# Patient Record
Sex: FEMALE | Race: WHITE | NOT HISPANIC OR LATINO
[De-identification: names, ages, dates, MRNs, and addresses within clinical notes are randomized per-mention and may not be internally consistent; named-entity substitution may affect disease eponyms.]

---

## 2017-05-03 DIAGNOSIS — J45.901 UNSPECIFIED ASTHMA WITH (ACUTE) EXACERBATION: ICD-10-CM

## 2017-05-03 DIAGNOSIS — L30.9 DERMATITIS, UNSPECIFIED: ICD-10-CM

## 2017-05-03 DIAGNOSIS — N89.8 OTHER SPECIFIED NONINFLAMMATORY DISORDERS OF VAGINA: ICD-10-CM

## 2017-05-03 DIAGNOSIS — Z33.1 PREGNANT STATE, INCIDENTAL: ICD-10-CM

## 2017-05-03 DIAGNOSIS — D50.9 IRON DEFICIENCY ANEMIA, UNSPECIFIED: ICD-10-CM

## 2017-05-03 DIAGNOSIS — R94.31 ABNORMAL ELECTROCARDIOGRAM [ECG] [EKG]: ICD-10-CM

## 2017-05-03 DIAGNOSIS — L50.3 DERMATOGRAPHIC URTICARIA: ICD-10-CM

## 2017-05-03 DIAGNOSIS — Z87.898 PERSONAL HISTORY OF OTHER SPECIFIED CONDITIONS: ICD-10-CM

## 2017-05-03 DIAGNOSIS — R00.2 PALPITATIONS: ICD-10-CM

## 2017-05-05 ENCOUNTER — APPOINTMENT (OUTPATIENT)
Dept: FAMILY MEDICINE | Facility: CLINIC | Age: 47
End: 2017-05-05

## 2017-06-26 ENCOUNTER — OUTPATIENT (OUTPATIENT)
Dept: OUTPATIENT SERVICES | Facility: HOSPITAL | Age: 47
LOS: 1 days | End: 2017-06-26
Payer: COMMERCIAL

## 2017-06-26 ENCOUNTER — APPOINTMENT (OUTPATIENT)
Dept: ULTRASOUND IMAGING | Facility: CLINIC | Age: 47
End: 2017-06-26

## 2017-06-26 ENCOUNTER — APPOINTMENT (OUTPATIENT)
Dept: MAMMOGRAPHY | Facility: CLINIC | Age: 47
End: 2017-06-26

## 2017-06-26 DIAGNOSIS — Z00.00 ENCOUNTER FOR GENERAL ADULT MEDICAL EXAMINATION WITHOUT ABNORMAL FINDINGS: ICD-10-CM

## 2017-06-26 DIAGNOSIS — Z98.89 OTHER SPECIFIED POSTPROCEDURAL STATES: Chronic | ICD-10-CM

## 2017-06-26 PROCEDURE — 77067 SCR MAMMO BI INCL CAD: CPT

## 2017-06-26 PROCEDURE — 77063 BREAST TOMOSYNTHESIS BI: CPT

## 2017-09-15 ENCOUNTER — APPOINTMENT (OUTPATIENT)
Dept: FAMILY MEDICINE | Facility: CLINIC | Age: 47
End: 2017-09-15

## 2017-12-06 ENCOUNTER — TRANSCRIPTION ENCOUNTER (OUTPATIENT)
Age: 47
End: 2017-12-06

## 2018-04-25 ENCOUNTER — APPOINTMENT (OUTPATIENT)
Dept: FAMILY MEDICINE | Facility: CLINIC | Age: 48
End: 2018-04-25
Payer: COMMERCIAL

## 2018-04-25 VITALS
HEART RATE: 64 BPM | HEIGHT: 67 IN | OXYGEN SATURATION: 99 % | WEIGHT: 171 LBS | DIASTOLIC BLOOD PRESSURE: 66 MMHG | SYSTOLIC BLOOD PRESSURE: 120 MMHG | BODY MASS INDEX: 26.84 KG/M2 | RESPIRATION RATE: 14 BRPM

## 2018-04-25 PROCEDURE — 99203 OFFICE O/P NEW LOW 30 MIN: CPT

## 2018-05-16 LAB
25(OH)D3 SERPL-MCNC: 21.8 NG/ML
ALBUMIN SERPL ELPH-MCNC: 4.2 G/DL
ALP BLD-CCNC: 66 U/L
ALT SERPL-CCNC: 7 U/L
ANION GAP SERPL CALC-SCNC: 15 MMOL/L
AST SERPL-CCNC: 17 U/L
BASOPHILS # BLD AUTO: 0.05 K/UL
BASOPHILS NFR BLD AUTO: 0.8 %
BILIRUB SERPL-MCNC: 0.3 MG/DL
BUN SERPL-MCNC: 11 MG/DL
CALCIUM SERPL-MCNC: 9.5 MG/DL
CHLORIDE SERPL-SCNC: 104 MMOL/L
CHOLEST SERPL-MCNC: 163 MG/DL
CHOLEST/HDLC SERPL: 2.8 RATIO
CO2 SERPL-SCNC: 23 MMOL/L
CREAT SERPL-MCNC: 0.93 MG/DL
EOSINOPHIL # BLD AUTO: 0.36 K/UL
EOSINOPHIL NFR BLD AUTO: 5.6 %
GLUCOSE SERPL-MCNC: 90 MG/DL
HCT VFR BLD CALC: 33.8 %
HDLC SERPL-MCNC: 58 MG/DL
HGB BLD-MCNC: 10.3 G/DL
IMM GRANULOCYTES NFR BLD AUTO: 0.2 %
LDLC SERPL CALC-MCNC: 90 MG/DL
LYMPHOCYTES # BLD AUTO: 1.7 K/UL
LYMPHOCYTES NFR BLD AUTO: 26.6 %
MAN DIFF?: NORMAL
MCHC RBC-ENTMCNC: 22.9 PG
MCHC RBC-ENTMCNC: 30.5 GM/DL
MCV RBC AUTO: 75.1 FL
MONOCYTES # BLD AUTO: 0.5 K/UL
MONOCYTES NFR BLD AUTO: 7.8 %
NEUTROPHILS # BLD AUTO: 3.76 K/UL
NEUTROPHILS NFR BLD AUTO: 59 %
PLATELET # BLD AUTO: 299 K/UL
POTASSIUM SERPL-SCNC: 4.6 MMOL/L
PROT SERPL-MCNC: 7.4 G/DL
RBC # BLD: 4.5 M/UL
RBC # FLD: 16.2 %
SODIUM SERPL-SCNC: 142 MMOL/L
TRIGL SERPL-MCNC: 73 MG/DL
TSH SERPL-ACNC: 4.06 UIU/ML
VIT B12 SERPL-MCNC: 499 PG/ML
WBC # FLD AUTO: 6.38 K/UL

## 2018-05-21 ENCOUNTER — OTHER (OUTPATIENT)
Age: 48
End: 2018-05-21

## 2018-05-25 ENCOUNTER — APPOINTMENT (OUTPATIENT)
Dept: FAMILY MEDICINE | Facility: CLINIC | Age: 48
End: 2018-05-25

## 2018-10-29 ENCOUNTER — TRANSCRIPTION ENCOUNTER (OUTPATIENT)
Age: 48
End: 2018-10-29

## 2018-11-20 LAB
FERRITIN SERPL-MCNC: 19 NG/ML
IRON SATN MFR SERPL: 17 %
IRON SERPL-MCNC: 52 UG/DL
TIBC SERPL-MCNC: 307 UG/DL
UIBC SERPL-MCNC: 255 UG/DL

## 2018-12-28 ENCOUNTER — APPOINTMENT (OUTPATIENT)
Dept: MAMMOGRAPHY | Facility: CLINIC | Age: 48
End: 2018-12-28
Payer: COMMERCIAL

## 2018-12-28 ENCOUNTER — OUTPATIENT (OUTPATIENT)
Dept: OUTPATIENT SERVICES | Facility: HOSPITAL | Age: 48
LOS: 1 days | End: 2018-12-28
Payer: COMMERCIAL

## 2018-12-28 DIAGNOSIS — Z98.89 OTHER SPECIFIED POSTPROCEDURAL STATES: Chronic | ICD-10-CM

## 2018-12-28 DIAGNOSIS — Z12.31 ENCOUNTER FOR SCREENING MAMMOGRAM FOR MALIGNANT NEOPLASM OF BREAST: ICD-10-CM

## 2018-12-28 PROCEDURE — 77063 BREAST TOMOSYNTHESIS BI: CPT | Mod: 26

## 2018-12-28 PROCEDURE — 77063 BREAST TOMOSYNTHESIS BI: CPT

## 2018-12-28 PROCEDURE — 77067 SCR MAMMO BI INCL CAD: CPT | Mod: 26

## 2018-12-28 PROCEDURE — 77067 SCR MAMMO BI INCL CAD: CPT

## 2019-07-01 ENCOUNTER — MEDICATION RENEWAL (OUTPATIENT)
Age: 49
End: 2019-07-01

## 2019-07-29 ENCOUNTER — TRANSCRIPTION ENCOUNTER (OUTPATIENT)
Age: 49
End: 2019-07-29

## 2019-09-13 ENCOUNTER — NON-APPOINTMENT (OUTPATIENT)
Age: 49
End: 2019-09-13

## 2019-09-13 ENCOUNTER — APPOINTMENT (OUTPATIENT)
Dept: FAMILY MEDICINE | Facility: CLINIC | Age: 49
End: 2019-09-13
Payer: COMMERCIAL

## 2019-09-13 VITALS
SYSTOLIC BLOOD PRESSURE: 118 MMHG | WEIGHT: 166 LBS | OXYGEN SATURATION: 98 % | HEIGHT: 67 IN | DIASTOLIC BLOOD PRESSURE: 70 MMHG | HEART RATE: 65 BPM | BODY MASS INDEX: 26.06 KG/M2

## 2019-09-13 DIAGNOSIS — Z72.89 OTHER PROBLEMS RELATED TO LIFESTYLE: ICD-10-CM

## 2019-09-13 DIAGNOSIS — Z80.0 FAMILY HISTORY OF MALIGNANT NEOPLASM OF DIGESTIVE ORGANS: ICD-10-CM

## 2019-09-13 LAB
BILIRUB UR QL STRIP: NORMAL
GLUCOSE UR-MCNC: NORMAL
HCG UR QL: 1 EU/DL
HGB UR QL STRIP.AUTO: NORMAL
KETONES UR-MCNC: NORMAL
LEUKOCYTE ESTERASE UR QL STRIP: ABNORMAL
NITRITE UR QL STRIP: NORMAL
PH UR STRIP: 7
PROT UR STRIP-MCNC: NORMAL
SP GR UR STRIP: 1.01

## 2019-09-13 PROCEDURE — 81003 URINALYSIS AUTO W/O SCOPE: CPT | Mod: QW

## 2019-09-13 PROCEDURE — 99396 PREV VISIT EST AGE 40-64: CPT | Mod: 25

## 2019-09-13 PROCEDURE — 93000 ELECTROCARDIOGRAM COMPLETE: CPT

## 2019-09-13 PROCEDURE — 36415 COLL VENOUS BLD VENIPUNCTURE: CPT

## 2019-09-13 NOTE — HISTORY OF PRESENT ILLNESS
[FreeTextEntry1] : Pt is here for annual physical. [de-identified] : pt here for fu, pt notes asthma good, pt notes nom complaints will be getting flu shot at work, pt has hx of anemia she recalls all her life, pt note no blood in stool, nor urine, pt still gets cycle

## 2019-09-13 NOTE — DATA REVIEWED
[FreeTextEntry1] : urine was neg, ekg was good\par oae passed\par cbc hgb was 10 in 2013 hgb elecrophoresis in 2015 was nl

## 2019-09-13 NOTE — HEALTH RISK ASSESSMENT
[Never (0 pts)] : Never (0 points) [No] : In the past 12 months have you used drugs other than those required for medical reasons? No [No falls in past year] : Patient reported no falls in the past year [0] : 2) Feeling down, depressed, or hopeless: Not at all (0) [Excellent] : ~his/her~ current health as excellent [Very Good] : ~his/her~  mood as very good [FreeTextEntry1] : cpe,  [] : No [Audit-CScore] : 0 [[id number]] : 0

## 2019-09-16 LAB
25(OH)D3 SERPL-MCNC: 40.4 NG/ML
ALBUMIN SERPL ELPH-MCNC: 4.6 G/DL
ALP BLD-CCNC: 56 U/L
ALT SERPL-CCNC: 11 U/L
ANION GAP SERPL CALC-SCNC: 12 MMOL/L
AST SERPL-CCNC: 22 U/L
BILIRUB SERPL-MCNC: 0.2 MG/DL
BUN SERPL-MCNC: 11 MG/DL
CALCIUM SERPL-MCNC: 9.9 MG/DL
CHLORIDE SERPL-SCNC: 100 MMOL/L
CHOLEST SERPL-MCNC: 164 MG/DL
CHOLEST/HDLC SERPL: 2.4 RATIO
CO2 SERPL-SCNC: 26 MMOL/L
CREAT SERPL-MCNC: 0.74 MG/DL
GLUCOSE SERPL-MCNC: 85 MG/DL
HDLC SERPL-MCNC: 69 MG/DL
LDLC SERPL CALC-MCNC: 84 MG/DL
POTASSIUM SERPL-SCNC: 4.4 MMOL/L
PROT SERPL-MCNC: 7.7 G/DL
SODIUM SERPL-SCNC: 138 MMOL/L
TRIGL SERPL-MCNC: 55 MG/DL
TSH SERPL-ACNC: 1.59 UIU/ML
VIT B12 SERPL-MCNC: 497 PG/ML

## 2019-10-02 LAB
HGB A MFR BLD: 97.5 %
HGB A2 MFR BLD: 2.5 %
HGB FRACT BLD-IMP: NORMAL

## 2019-10-08 ENCOUNTER — CLINICAL ADVICE (OUTPATIENT)
Age: 49
End: 2019-10-08

## 2019-10-16 LAB
BASOPHILS # BLD AUTO: 0.07 K/UL
BASOPHILS NFR BLD AUTO: 1.1 %
EOSINOPHIL # BLD AUTO: 0.79 K/UL
EOSINOPHIL NFR BLD AUTO: 12.1 %
HCT VFR BLD CALC: 40.3 %
HGB BLD-MCNC: 13.1 G/DL
IMM GRANULOCYTES NFR BLD AUTO: 0.3 %
LYMPHOCYTES # BLD AUTO: 1.79 K/UL
LYMPHOCYTES NFR BLD AUTO: 27.4 %
MAN DIFF?: NORMAL
MCHC RBC-ENTMCNC: 29.4 PG
MCHC RBC-ENTMCNC: 32.5 GM/DL
MCV RBC AUTO: 90.6 FL
MONOCYTES # BLD AUTO: 0.55 K/UL
MONOCYTES NFR BLD AUTO: 8.4 %
NEUTROPHILS # BLD AUTO: 3.32 K/UL
NEUTROPHILS NFR BLD AUTO: 50.7 %
PLATELET # BLD AUTO: 252 K/UL
RBC # BLD: 4.45 M/UL
RBC # FLD: 13.2 %
WBC # FLD AUTO: 6.54 K/UL

## 2020-01-12 ENCOUNTER — TRANSCRIPTION ENCOUNTER (OUTPATIENT)
Age: 50
End: 2020-01-12

## 2020-02-03 ENCOUNTER — APPOINTMENT (OUTPATIENT)
Dept: FAMILY MEDICINE | Facility: CLINIC | Age: 50
End: 2020-02-03
Payer: COMMERCIAL

## 2020-02-03 VITALS
DIASTOLIC BLOOD PRESSURE: 68 MMHG | SYSTOLIC BLOOD PRESSURE: 100 MMHG | HEART RATE: 64 BPM | HEIGHT: 67 IN | WEIGHT: 170 LBS | OXYGEN SATURATION: 99 % | BODY MASS INDEX: 26.68 KG/M2

## 2020-02-03 DIAGNOSIS — S60.229A CONTUSION OF UNSPECIFIED HAND, INITIAL ENCOUNTER: ICD-10-CM

## 2020-02-03 LAB — CYTOLOGY CVX/VAG DOC THIN PREP: NEGATIVE

## 2020-02-03 PROCEDURE — 99214 OFFICE O/P EST MOD 30 MIN: CPT

## 2020-02-03 NOTE — HISTORY OF PRESENT ILLNESS
[FreeTextEntry1] : Pt here to follow up from Urgent care. Pt fell 01/09/2020 trip on curb and twisted left hand. Pt c/o tingling sensation left hand [de-identified] : pt needs xray of l hand as persistent swelling and stiff and tingling into index and middle finger, pt notes some tingling at l wrist, pt locates some soresnees on ulnar side of wrist, at time of injury black and blus ofsecond and third fingers, pt notes she did not hit head, pt notes no problems ambulating  \par pt reports she has been usingf her inhlaer more often in am thinking it is her asthma but she reports is a constant cleariong of her throat

## 2020-02-06 ENCOUNTER — FORM ENCOUNTER (OUTPATIENT)
Age: 50
End: 2020-02-06

## 2020-02-07 ENCOUNTER — OUTPATIENT (OUTPATIENT)
Dept: OUTPATIENT SERVICES | Facility: HOSPITAL | Age: 50
LOS: 1 days | End: 2020-02-07
Payer: COMMERCIAL

## 2020-02-07 ENCOUNTER — APPOINTMENT (OUTPATIENT)
Dept: RADIOLOGY | Facility: CLINIC | Age: 50
End: 2020-02-07
Payer: COMMERCIAL

## 2020-02-07 DIAGNOSIS — Z98.89 OTHER SPECIFIED POSTPROCEDURAL STATES: Chronic | ICD-10-CM

## 2020-02-07 DIAGNOSIS — S60.229A CONTUSION OF UNSPECIFIED HAND, INITIAL ENCOUNTER: ICD-10-CM

## 2020-02-07 PROCEDURE — 73130 X-RAY EXAM OF HAND: CPT

## 2020-02-07 PROCEDURE — 73110 X-RAY EXAM OF WRIST: CPT

## 2020-02-07 PROCEDURE — 73110 X-RAY EXAM OF WRIST: CPT | Mod: 26,LT

## 2020-02-07 PROCEDURE — 73130 X-RAY EXAM OF HAND: CPT | Mod: 26,LT

## 2020-02-25 ENCOUNTER — APPOINTMENT (OUTPATIENT)
Dept: ORTHOPEDIC SURGERY | Facility: CLINIC | Age: 50
End: 2020-02-25
Payer: COMMERCIAL

## 2020-02-25 VITALS
SYSTOLIC BLOOD PRESSURE: 114 MMHG | WEIGHT: 170 LBS | HEART RATE: 60 BPM | BODY MASS INDEX: 26.68 KG/M2 | HEIGHT: 67 IN | DIASTOLIC BLOOD PRESSURE: 73 MMHG

## 2020-02-25 DIAGNOSIS — M65.322 TRIGGER FINGER, LEFT INDEX FINGER: ICD-10-CM

## 2020-02-25 DIAGNOSIS — M65.332 TRIGGER FINGER, LEFT MIDDLE FINGER: ICD-10-CM

## 2020-02-25 PROCEDURE — 99204 OFFICE O/P NEW MOD 45 MIN: CPT | Mod: 25

## 2020-02-25 PROCEDURE — 20550 NJX 1 TENDON SHEATH/LIGAMENT: CPT | Mod: 59,F2

## 2020-04-25 ENCOUNTER — MESSAGE (OUTPATIENT)
Age: 50
End: 2020-04-25

## 2020-05-10 ENCOUNTER — TRANSCRIPTION ENCOUNTER (OUTPATIENT)
Age: 50
End: 2020-05-10

## 2020-05-15 ENCOUNTER — APPOINTMENT (OUTPATIENT)
Dept: DISASTER EMERGENCY | Facility: CLINIC | Age: 50
End: 2020-05-15

## 2020-12-03 ENCOUNTER — APPOINTMENT (OUTPATIENT)
Dept: MAMMOGRAPHY | Facility: CLINIC | Age: 50
End: 2020-12-03
Payer: COMMERCIAL

## 2020-12-03 ENCOUNTER — OUTPATIENT (OUTPATIENT)
Dept: OUTPATIENT SERVICES | Facility: HOSPITAL | Age: 50
LOS: 1 days | End: 2020-12-03
Payer: COMMERCIAL

## 2020-12-03 DIAGNOSIS — Z00.8 ENCOUNTER FOR OTHER GENERAL EXAMINATION: ICD-10-CM

## 2020-12-03 DIAGNOSIS — Z12.31 ENCOUNTER FOR SCREENING MAMMOGRAM FOR MALIGNANT NEOPLASM OF BREAST: ICD-10-CM

## 2020-12-03 DIAGNOSIS — Z98.89 OTHER SPECIFIED POSTPROCEDURAL STATES: Chronic | ICD-10-CM

## 2020-12-03 PROCEDURE — 77063 BREAST TOMOSYNTHESIS BI: CPT

## 2020-12-03 PROCEDURE — 77067 SCR MAMMO BI INCL CAD: CPT

## 2020-12-03 PROCEDURE — 77067 SCR MAMMO BI INCL CAD: CPT | Mod: 26

## 2020-12-03 PROCEDURE — 77063 BREAST TOMOSYNTHESIS BI: CPT | Mod: 26

## 2021-03-13 ENCOUNTER — APPOINTMENT (OUTPATIENT)
Dept: FAMILY MEDICINE | Facility: CLINIC | Age: 51
End: 2021-03-13
Payer: COMMERCIAL

## 2021-03-13 ENCOUNTER — NON-APPOINTMENT (OUTPATIENT)
Age: 51
End: 2021-03-13

## 2021-03-13 VITALS
BODY MASS INDEX: 25.9 KG/M2 | HEART RATE: 84 BPM | WEIGHT: 165 LBS | SYSTOLIC BLOOD PRESSURE: 114 MMHG | HEIGHT: 67 IN | DIASTOLIC BLOOD PRESSURE: 70 MMHG | OXYGEN SATURATION: 99 % | TEMPERATURE: 97.2 F

## 2021-03-13 DIAGNOSIS — D64.9 ANEMIA, UNSPECIFIED: ICD-10-CM

## 2021-03-13 DIAGNOSIS — J45.901 UNSPECIFIED ASTHMA WITH (ACUTE) EXACERBATION: ICD-10-CM

## 2021-03-13 DIAGNOSIS — R07.89 OTHER CHEST PAIN: ICD-10-CM

## 2021-03-13 PROCEDURE — 99214 OFFICE O/P EST MOD 30 MIN: CPT | Mod: 25

## 2021-03-13 PROCEDURE — 99072 ADDL SUPL MATRL&STAF TM PHE: CPT

## 2021-03-13 PROCEDURE — 36415 COLL VENOUS BLD VENIPUNCTURE: CPT

## 2021-03-13 PROCEDURE — 93000 ELECTROCARDIOGRAM COMPLETE: CPT

## 2021-03-13 RX ORDER — METHYLPREDNISOLONE 40 MG/ML
40 INJECTION, POWDER, LYOPHILIZED, FOR SOLUTION INTRAMUSCULAR; INTRAVENOUS
Qty: 1 | Refills: 0 | Status: DISCONTINUED | COMMUNITY
Start: 2020-02-25 | End: 2021-03-13

## 2021-03-13 NOTE — HISTORY OF PRESENT ILLNESS
[FreeTextEntry1] : Pt following up on asthma, pt notes kicked up in early feb, pt had last  covid shot early feb, , pt notes cough seems to be at night and if laugh cough , pt notes feels pressure  feel exercise, notes wheeze at times at night, , pt is a runner, runs, 2 times a week, , about 3 miles  a time, pt notes not as easy to do over last 6 weeks,pt notes no fever, no nasal, pt notes no cp, pt notes when fire place is on she feels tight and coughs, pt notes no problem before , , pt has never been swabbed for covid, no active ss now, pt had congestion in feb in chest and  none now, pt notes now still w chest restiction , feel a pressure when exerts, pt notes non painful, pt has not had labs since 2019 when anemia resolved

## 2021-03-13 NOTE — REVIEW OF SYSTEMS
[Fever] : no fever [Chest Pain] : no chest pain [Palpitations] : no palpitations [Shortness Of Breath] : no shortness of breath [Wheezing] : wheezing [Cough] : cough [Dyspnea on Exertion] : no dyspnea on exertion [FreeTextEntry6] : chest feels pressure w exertion

## 2021-03-15 ENCOUNTER — OUTPATIENT (OUTPATIENT)
Dept: OUTPATIENT SERVICES | Facility: HOSPITAL | Age: 51
LOS: 1 days | End: 2021-03-15
Payer: COMMERCIAL

## 2021-03-15 ENCOUNTER — APPOINTMENT (OUTPATIENT)
Dept: RADIOLOGY | Facility: CLINIC | Age: 51
End: 2021-03-15
Payer: COMMERCIAL

## 2021-03-15 DIAGNOSIS — J45.901 UNSPECIFIED ASTHMA WITH (ACUTE) EXACERBATION: ICD-10-CM

## 2021-03-15 DIAGNOSIS — Z98.89 OTHER SPECIFIED POSTPROCEDURAL STATES: Chronic | ICD-10-CM

## 2021-03-15 LAB
25(OH)D3 SERPL-MCNC: 30.9 NG/ML
ALBUMIN SERPL ELPH-MCNC: 4.3 G/DL
ALP BLD-CCNC: 50 U/L
ALT SERPL-CCNC: 14 U/L
ANION GAP SERPL CALC-SCNC: 5 MMOL/L
AST SERPL-CCNC: 21 U/L
BASOPHILS # BLD AUTO: 0.05 K/UL
BASOPHILS NFR BLD AUTO: 1 %
BILIRUB SERPL-MCNC: 0.6 MG/DL
BUN SERPL-MCNC: 13 MG/DL
CALCIUM SERPL-MCNC: 9.3 MG/DL
CHLORIDE SERPL-SCNC: 106 MMOL/L
CHOLEST SERPL-MCNC: 146 MG/DL
CO2 SERPL-SCNC: 28 MMOL/L
CREAT SERPL-MCNC: 0.83 MG/DL
EOSINOPHIL # BLD AUTO: 0.48 K/UL
EOSINOPHIL NFR BLD AUTO: 9.5 %
GLUCOSE SERPL-MCNC: 90 MG/DL
HCT VFR BLD CALC: 38.1 %
HDLC SERPL-MCNC: 58 MG/DL
HGB BLD-MCNC: 12.7 G/DL
IMM GRANULOCYTES NFR BLD AUTO: 0.2 %
IRON SERPL-MCNC: 65 UG/DL
LDLC SERPL CALC-MCNC: 80 MG/DL
LYMPHOCYTES # BLD AUTO: 1.61 K/UL
LYMPHOCYTES NFR BLD AUTO: 31.8 %
MAN DIFF?: NORMAL
MCHC RBC-ENTMCNC: 30.8 PG
MCHC RBC-ENTMCNC: 33.3 GM/DL
MCV RBC AUTO: 92.3 FL
MONOCYTES # BLD AUTO: 0.41 K/UL
MONOCYTES NFR BLD AUTO: 8.1 %
NEUTROPHILS # BLD AUTO: 2.51 K/UL
NEUTROPHILS NFR BLD AUTO: 49.4 %
NONHDLC SERPL-MCNC: 89 MG/DL
PLATELET # BLD AUTO: 239 K/UL
POTASSIUM SERPL-SCNC: 4.2 MMOL/L
PROT SERPL-MCNC: 7.1 G/DL
RBC # BLD: 4.13 M/UL
RBC # FLD: 13.6 %
SODIUM SERPL-SCNC: 139 MMOL/L
TRIGL SERPL-MCNC: 43 MG/DL
TSH SERPL-ACNC: 1.8 UIU/ML
WBC # FLD AUTO: 5.07 K/UL

## 2021-03-15 PROCEDURE — 71046 X-RAY EXAM CHEST 2 VIEWS: CPT | Mod: 26

## 2021-03-15 PROCEDURE — 71046 X-RAY EXAM CHEST 2 VIEWS: CPT

## 2021-06-18 ENCOUNTER — APPOINTMENT (OUTPATIENT)
Dept: DERMATOLOGY | Facility: CLINIC | Age: 51
End: 2021-06-18

## 2021-10-14 ENCOUNTER — APPOINTMENT (OUTPATIENT)
Dept: FAMILY MEDICINE | Facility: CLINIC | Age: 51
End: 2021-10-14
Payer: COMMERCIAL

## 2021-10-14 VITALS
SYSTOLIC BLOOD PRESSURE: 128 MMHG | DIASTOLIC BLOOD PRESSURE: 78 MMHG | WEIGHT: 165 LBS | OXYGEN SATURATION: 99 % | BODY MASS INDEX: 25.9 KG/M2 | HEIGHT: 67 IN | HEART RATE: 77 BPM | TEMPERATURE: 97.5 F

## 2021-10-14 DIAGNOSIS — M54.50 LOW BACK PAIN, UNSPECIFIED: ICD-10-CM

## 2021-10-14 DIAGNOSIS — M54.9 DORSALGIA, UNSPECIFIED: ICD-10-CM

## 2021-10-14 PROCEDURE — 96372 THER/PROPH/DIAG INJ SC/IM: CPT

## 2021-10-14 PROCEDURE — 99214 OFFICE O/P EST MOD 30 MIN: CPT | Mod: 25

## 2021-10-14 RX ORDER — DEXAMETHASONE SODIUM PHOSPHATE 4 MG/ML
4 INJECTION, SOLUTION INTRAMUSCULAR; INTRAVENOUS
Qty: 0 | Refills: 0 | Status: COMPLETED | OUTPATIENT
Start: 2021-10-14

## 2021-10-14 RX ADMIN — DEXAMETHASONE SODIUM PHOSPHATE 2 MG/ML: 4 INJECTION, SOLUTION INTRA-ARTICULAR; INTRALESIONAL; INTRAMUSCULAR; INTRAVENOUS; SOFT TISSUE at 00:00

## 2021-10-14 NOTE — HISTORY OF PRESENT ILLNESS
[FreeTextEntry8] : Ms. ZAHEER BOWENS is a 51 year female who presents to office c/o severe low back pain x 2 days.\par Pt. states she was showing a coworker something at work and moved her foot outwards and felt and heard a snap/pop in her left lower back. Patient makes it difficult to sit or stand or bend. She has mild tingling in the upper front left thigh, denies bladder or bowel symptoms, or extremity weakness.  Pt. states the pain is a 20 on a scale of 1-10.

## 2021-10-14 NOTE — PHYSICAL EXAM
[Normal] : no acute distress, well nourished, well developed and well-appearing [Normal Sclera/Conjunctiva] : normal sclera/conjunctiva [Normal Outer Ear/Nose] : the outer ears and nose were normal in appearance [Supple] : supple [No Respiratory Distress] : no respiratory distress  [No Accessory Muscle Use] : no accessory muscle use [No Edema] : there was no peripheral edema [No Focal Deficits] : no focal deficits [No Joint Swelling] : no joint swelling [Normal Gait] : normal gait [Normal Affect] : the affect was normal [Normal Insight/Judgement] : insight and judgment were intact [de-identified] : left lumbar paraspinal muscle spasm

## 2021-10-15 ENCOUNTER — APPOINTMENT (OUTPATIENT)
Dept: RADIOLOGY | Facility: CLINIC | Age: 51
End: 2021-10-15

## 2021-10-18 ENCOUNTER — APPOINTMENT (OUTPATIENT)
Dept: CT IMAGING | Facility: CLINIC | Age: 51
End: 2021-10-18

## 2021-10-20 ENCOUNTER — APPOINTMENT (OUTPATIENT)
Dept: ORTHOPEDIC SURGERY | Facility: CLINIC | Age: 51
End: 2021-10-20
Payer: COMMERCIAL

## 2021-10-20 VITALS
HEIGHT: 67 IN | SYSTOLIC BLOOD PRESSURE: 125 MMHG | HEART RATE: 68 BPM | DIASTOLIC BLOOD PRESSURE: 83 MMHG | BODY MASS INDEX: 25.9 KG/M2 | WEIGHT: 165 LBS

## 2021-10-20 DIAGNOSIS — M47.816 SPONDYLOSIS W/OUT MYELOPATHY OR RADICULOPATHY, LUMBAR REGION: ICD-10-CM

## 2021-10-20 DIAGNOSIS — M60.9 MYOSITIS, UNSPECIFIED: ICD-10-CM

## 2021-10-20 PROCEDURE — 72100 X-RAY EXAM L-S SPINE 2/3 VWS: CPT

## 2021-10-20 PROCEDURE — 99214 OFFICE O/P EST MOD 30 MIN: CPT

## 2021-10-20 NOTE — HISTORY OF PRESENT ILLNESS
[de-identified] : Susy is a 50 y/o female here for evaluation of low back and occasional pain into both anterior thigh. She notes this pain started about 2 weeks ago, she felt she may have pulled her back, but currently she's having difficulty sitting, noting sitting for long periods of time which is required from her job, pain exacerbates in the low back.  She states standing and walking is better. She notes when pain is intense in the low back, she does feel a twinge of pain radiating into both anterior thighs.  She has taken a medrol dose pack with some relief but no other therapy at this time. She denied weakness or loss of bowel or bladder function.

## 2021-10-20 NOTE — REASON FOR VISIT
[Initial Visit] : an initial visit for [Back Pain] : back pain [Radiculopathy] : radiculopathy [FreeTextEntry2] : lower back pain

## 2021-10-20 NOTE — DISCUSSION/SUMMARY
[de-identified] : Susy has been given home guided stretching exercises for low back, discussed treatments of acute onset of low back pain, myositis pain.  Discussed different pain reducing regimen including anti-inflammatory medications, topical treatments such as ice/heat, creams and patches.  I will provide her with a repeat medrol dose pack and once completing steroid pack, advised to start naproxen given today. I strongly suggest that she needs to stretch daily from the home guided stretching instructions.  Advised if pain does not subside in the next 3-4 weeks, advised for repeat visit at which point will consider MRI lumbar spine or physiatry for injection therapy.

## 2021-10-20 NOTE — PHYSICAL EXAM
[de-identified] : CONSTITUTIONAL: The patient is a very pleasant individual who is well-nourished and who appears stated age.\par PSYCHIATRIC: The patient is alert and oriented X 3 and in no apparent distress, and participates with orthopedic evaluation well.\par HEAD: Atraumatic and is nonsyndromic in appearance.\par EENT: No visible thyromegaly, EOMI.\par RESPIRATORY: Respiratory rate is regular, not dyspneic on examination.\par LYMPHATICS: There is no inguinal lymphadenopathy\par INTEGUMENTARY: Skin is clean, dry, and intact about the bilateral lower extremities and lumbar spine.\par VASCULAR: There is brisk capillary refill about the bilateral lower extremities.\par NEUROLOGIC: There are no pathologic reflexes. There is no decrease in sensation of the bilateral lower extremities on Wartenberg pinwheel/manual examination. Deep tendon reflexes are well maintained at 2+/4 of the bilateral lower extremities and are symmetric..\par MUSCULOSKELETAL: There is no visible muscular atrophy. Manual motor strength is well maintained in the bilateral lower extremities. Range of motion of lumbar spine is well maintained. The patient ambulates in a non-myelopathic manner. Negative tension sign and straight leg raise bilaterally. Quad extension, ankle dorsiflexion, EHL, plantar flexion, and ankle eversion are well preserved. Normal secondary orthopaedic exam of bilateral hips, greater trochanteric area, knees and ankles\par  [de-identified] : lumbar spine xray today 10/20/21: mild age appropriate lumbar degenerative disc disease, lumbar lordosis preserved.

## 2021-10-25 ENCOUNTER — APPOINTMENT (OUTPATIENT)
Dept: CT IMAGING | Facility: CLINIC | Age: 51
End: 2021-10-25

## 2021-10-26 ENCOUNTER — APPOINTMENT (OUTPATIENT)
Dept: ORTHOPEDIC SURGERY | Facility: CLINIC | Age: 51
End: 2021-10-26

## 2021-11-03 ENCOUNTER — APPOINTMENT (OUTPATIENT)
Dept: DERMATOLOGY | Facility: CLINIC | Age: 51
End: 2021-11-03

## 2021-12-04 ENCOUNTER — APPOINTMENT (OUTPATIENT)
Dept: MAMMOGRAPHY | Facility: CLINIC | Age: 51
End: 2021-12-04

## 2021-12-24 ENCOUNTER — OUTPATIENT (OUTPATIENT)
Dept: OUTPATIENT SERVICES | Facility: HOSPITAL | Age: 51
LOS: 1 days | End: 2021-12-24
Payer: COMMERCIAL

## 2021-12-24 ENCOUNTER — APPOINTMENT (OUTPATIENT)
Dept: MAMMOGRAPHY | Facility: CLINIC | Age: 51
End: 2021-12-24
Payer: COMMERCIAL

## 2021-12-24 DIAGNOSIS — Z00.8 ENCOUNTER FOR OTHER GENERAL EXAMINATION: ICD-10-CM

## 2021-12-24 DIAGNOSIS — Z98.89 OTHER SPECIFIED POSTPROCEDURAL STATES: Chronic | ICD-10-CM

## 2021-12-24 PROCEDURE — 77067 SCR MAMMO BI INCL CAD: CPT

## 2021-12-24 PROCEDURE — 77063 BREAST TOMOSYNTHESIS BI: CPT

## 2021-12-24 PROCEDURE — 77063 BREAST TOMOSYNTHESIS BI: CPT | Mod: 26

## 2021-12-24 PROCEDURE — 77067 SCR MAMMO BI INCL CAD: CPT | Mod: 26

## 2022-03-29 ENCOUNTER — APPOINTMENT (OUTPATIENT)
Dept: FAMILY MEDICINE | Facility: CLINIC | Age: 52
End: 2022-03-29
Payer: COMMERCIAL

## 2022-03-29 DIAGNOSIS — R05.8 OTHER SPECIFIED COUGH: ICD-10-CM

## 2022-03-29 DIAGNOSIS — Z23 ENCOUNTER FOR IMMUNIZATION: ICD-10-CM

## 2022-03-29 PROCEDURE — 99213 OFFICE O/P EST LOW 20 MIN: CPT | Mod: 95

## 2022-03-29 NOTE — HISTORY OF PRESENT ILLNESS
[Home] : at home, [unfilled] , at the time of the visit. [Medical Office: ([facility] [place])___] : at the medical office located in  [Verbal consent obtained from patient] : the patient, [unfilled] [FreeTextEntry8] : Patient c/o chest congestion.pt, has hx of asthma has been on singulair regularly but takes in am, pt has hads this am cough for 2 mos and had coivd one month ago, pt notes no fever, pt does not feel like a cold,  pt is coughing for few minutes in am, pt notes no color, pt not sob, pt thinks could be wood burning smoke form in house that  does every night, pt is fully vaccinted and boosted

## 2022-03-29 NOTE — PHYSICAL EXAM
[No Acute Distress] : no acute distress [de-identified] : did not couugh once during vist and talks in full sentences

## 2022-09-16 ENCOUNTER — APPOINTMENT (OUTPATIENT)
Dept: DERMATOLOGY | Facility: CLINIC | Age: 52
End: 2022-09-16

## 2022-09-16 PROCEDURE — 99203 OFFICE O/P NEW LOW 30 MIN: CPT

## 2022-09-27 ENCOUNTER — APPOINTMENT (OUTPATIENT)
Dept: ORTHOPEDIC SURGERY | Facility: CLINIC | Age: 52
End: 2022-09-27

## 2022-09-27 VITALS
BODY MASS INDEX: 25.11 KG/M2 | DIASTOLIC BLOOD PRESSURE: 80 MMHG | HEART RATE: 60 BPM | WEIGHT: 160 LBS | HEIGHT: 67 IN | SYSTOLIC BLOOD PRESSURE: 126 MMHG

## 2022-09-27 DIAGNOSIS — R20.0 ANESTHESIA OF SKIN: ICD-10-CM

## 2022-09-27 DIAGNOSIS — M25.532 PAIN IN LEFT WRIST: ICD-10-CM

## 2022-09-27 PROCEDURE — 99214 OFFICE O/P EST MOD 30 MIN: CPT | Mod: 25

## 2022-09-27 PROCEDURE — 29125 APPL SHORT ARM SPLINT STATIC: CPT | Mod: 50

## 2022-09-27 PROCEDURE — 73110 X-RAY EXAM OF WRIST: CPT | Mod: 50

## 2022-09-27 NOTE — PHYSICAL EXAM
[de-identified] : General Exam:\par \par [The patient is alert and oriented, is well appearing, and in no apparent distress]\par [Cervical spine mobility is full in all directions]\par [There are no apparent skin lesions, ulcers, or masses]\par [Inspection of the extremities shows no signs of skin changes or muscle asymmetry]\par \par Examination of her left wrist reveals wrist tenderness with volar compression of the carpal tunnel eliciting numbness and tingling in the median nerve distribution.  She has numbness to light touch throughout the median nerve distribution.  She has good thenar bulk and she is 5 out of 5 with AB duction of the thumb.  [de-identified] : [4] views of bilateral hands and wrists were obtained today in my office and were seen by me and discussed with the patient. \par These show findings consistent with mild bilateral positive ulnar variance.  She has grossly preserved radiocarpal wrist joints and no bony lesions throughout the fingers and hands\par \par

## 2022-09-27 NOTE — HISTORY OF PRESENT ILLNESS
[FreeTextEntry1] : Susy is a very pleasant 52-year-old female coming in today with a 2-year complaint of left wrist pain and left hand numbness and tingling.  She states that this awakens her at night and she has to shake her wrist.  She is having difficulty with dexterity.  She states that she had a fall back in 2020 and that is when she thinks the problem started.

## 2022-09-27 NOTE — DISCUSSION/SUMMARY
[FreeTextEntry1] : I am hopeful that the wrist brace will be of significant benefit to her.  I would like to see her back once she gets the EMG nerve conduction test.

## 2022-09-27 NOTE — ASSESSMENT
[FreeTextEntry1] : ASSESSMENT:\par \par The patient comes in today with chronic complaints of left wrist pain complicated by the fact that she has persistent numbness and tingling as well as frequent nighttime awakenings that have caused her significant discomfort.  She complains of issues with dexterity as well\par \par [This may diminish bodily function for the extremity.] \par  \par [I did send for further workup.]  This consists of a nerve study in the form of an EMG nerve conduction study to assess for peripheral nerve compression and severity.\par \par She was adequately and thoroughly informed of my assessment of their current condition(s). \par \par I have recommended that she wear a left wrist brace in order to neutralize her wrist especially when she sleeps at nighttime.  We discussed the pathophysiology of carpal tunnel syndrome and nerve compression in general.  To further assess the possibility of this diagnosis I have sent her for an EMG.\par \par

## 2022-09-27 NOTE — CONSULT LETTER
[Dear  ___] : Dear  [unfilled], [Consult Letter:] : I had the pleasure of evaluating your patient, [unfilled]. [Please see my note below.] : Please see my note below. [Consult Closing:] : Thank you very much for allowing me to participate in the care of this patient.  If you have any questions, please do not hesitate to contact me. [Sincerely,] : Sincerely, [FreeTextEntry2] : AMMON BOOTH\par  [FreeTextEntry3] : Carmelo Ibanez MD\par [facility] [place]\par [facility]\par [address]\[place] [address]\par Tel [phone]\par Fax [phone] \par \par For same day and next day orthopedic appointments contact:\par [email or web address] |[phone])\par Appointments available nights and weekends!  \par \par [facility] [place]\par Visit us at [email or web address]\par

## 2023-02-26 ENCOUNTER — NON-APPOINTMENT (OUTPATIENT)
Age: 53
End: 2023-02-26

## 2023-03-03 ENCOUNTER — APPOINTMENT (OUTPATIENT)
Dept: FAMILY MEDICINE | Facility: CLINIC | Age: 53
End: 2023-03-03
Payer: COMMERCIAL

## 2023-03-03 VITALS
HEART RATE: 78 BPM | BODY MASS INDEX: 25.9 KG/M2 | OXYGEN SATURATION: 99 % | DIASTOLIC BLOOD PRESSURE: 80 MMHG | HEIGHT: 67 IN | SYSTOLIC BLOOD PRESSURE: 122 MMHG | WEIGHT: 165 LBS | TEMPERATURE: 97.7 F | RESPIRATION RATE: 14 BRPM

## 2023-03-03 DIAGNOSIS — S32.2XXA FRACTURE OF COCCYX, INITIAL ENCOUNTER FOR CLOSED FRACTURE: ICD-10-CM

## 2023-03-03 DIAGNOSIS — M79.18 MYALGIA, OTHER SITE: ICD-10-CM

## 2023-03-03 PROCEDURE — 99214 OFFICE O/P EST MOD 30 MIN: CPT

## 2023-03-03 NOTE — PHYSICAL EXAM
[No Acute Distress] : no acute distress [Clear to Auscultation] : lungs were clear to auscultation bilaterally [Normal S1, S2] : normal S1 and S2 [de-identified] : neg behzad sims ,  [de-identified] : good power bl hips and legs, nl hip rom bl, pt tender in l priformis area

## 2023-03-03 NOTE — HISTORY OF PRESENT ILLNESS
[FreeTextEntry8] : Patient fell 2 weeks roller skating , pt fell on coocyx, pt notes dev black and blue of buttock, pt went to urgent care this week after not getting better and xray  was told was neg, pt was told after image was resulted that she shouil;d see spine specialist, pt was tld to do ct and they said fu promary , pt notes sitting is better, no issue moving bowels \par

## 2023-03-04 ENCOUNTER — APPOINTMENT (OUTPATIENT)
Dept: CT IMAGING | Facility: CLINIC | Age: 53
End: 2023-03-04
Payer: COMMERCIAL

## 2023-03-04 ENCOUNTER — APPOINTMENT (OUTPATIENT)
Dept: RADIOLOGY | Facility: CLINIC | Age: 53
End: 2023-03-04
Payer: COMMERCIAL

## 2023-03-04 PROCEDURE — 72100 X-RAY EXAM L-S SPINE 2/3 VWS: CPT | Mod: 26

## 2023-03-13 ENCOUNTER — APPOINTMENT (OUTPATIENT)
Dept: CT IMAGING | Facility: CLINIC | Age: 53
End: 2023-03-13
Payer: COMMERCIAL

## 2023-03-13 ENCOUNTER — OUTPATIENT (OUTPATIENT)
Dept: OUTPATIENT SERVICES | Facility: HOSPITAL | Age: 53
LOS: 1 days | End: 2023-03-13
Payer: COMMERCIAL

## 2023-03-13 ENCOUNTER — RESULT REVIEW (OUTPATIENT)
Age: 53
End: 2023-03-13

## 2023-03-13 DIAGNOSIS — Z98.89 OTHER SPECIFIED POSTPROCEDURAL STATES: Chronic | ICD-10-CM

## 2023-03-13 DIAGNOSIS — S32.2XXA FRACTURE OF COCCYX, INITIAL ENCOUNTER FOR CLOSED FRACTURE: ICD-10-CM

## 2023-03-13 PROCEDURE — 76376 3D RENDER W/INTRP POSTPROCES: CPT | Mod: 26

## 2023-03-13 PROCEDURE — 72192 CT PELVIS W/O DYE: CPT | Mod: 26

## 2023-03-13 PROCEDURE — 76376 3D RENDER W/INTRP POSTPROCES: CPT

## 2023-03-13 PROCEDURE — 72192 CT PELVIS W/O DYE: CPT

## 2023-03-15 ENCOUNTER — TRANSCRIPTION ENCOUNTER (OUTPATIENT)
Age: 53
End: 2023-03-15

## 2023-03-17 ENCOUNTER — APPOINTMENT (OUTPATIENT)
Dept: MAMMOGRAPHY | Facility: CLINIC | Age: 53
End: 2023-03-17
Payer: COMMERCIAL

## 2023-03-17 ENCOUNTER — OUTPATIENT (OUTPATIENT)
Dept: OUTPATIENT SERVICES | Facility: HOSPITAL | Age: 53
LOS: 1 days | End: 2023-03-17
Payer: COMMERCIAL

## 2023-03-17 DIAGNOSIS — Z12.31 ENCOUNTER FOR SCREENING MAMMOGRAM FOR MALIGNANT NEOPLASM OF BREAST: ICD-10-CM

## 2023-03-17 DIAGNOSIS — Z00.8 ENCOUNTER FOR OTHER GENERAL EXAMINATION: ICD-10-CM

## 2023-03-17 PROCEDURE — 77063 BREAST TOMOSYNTHESIS BI: CPT | Mod: 26

## 2023-03-17 PROCEDURE — 77067 SCR MAMMO BI INCL CAD: CPT

## 2023-03-17 PROCEDURE — 77063 BREAST TOMOSYNTHESIS BI: CPT

## 2023-03-17 PROCEDURE — 77067 SCR MAMMO BI INCL CAD: CPT | Mod: 26

## 2023-03-21 ENCOUNTER — APPOINTMENT (OUTPATIENT)
Dept: ORTHOPEDIC SURGERY | Facility: CLINIC | Age: 53
End: 2023-03-21

## 2023-07-22 ENCOUNTER — APPOINTMENT (OUTPATIENT)
Dept: ORTHOPEDIC SURGERY | Facility: CLINIC | Age: 53
End: 2023-07-22
Payer: COMMERCIAL

## 2023-07-22 VITALS
HEART RATE: 73 BPM | SYSTOLIC BLOOD PRESSURE: 137 MMHG | DIASTOLIC BLOOD PRESSURE: 85 MMHG | HEIGHT: 67 IN | BODY MASS INDEX: 25.9 KG/M2 | WEIGHT: 165 LBS

## 2023-07-22 DIAGNOSIS — M53.3 SACROCOCCYGEAL DISORDERS, NOT ELSEWHERE CLASSIFIED: ICD-10-CM

## 2023-07-22 PROCEDURE — 99214 OFFICE O/P EST MOD 30 MIN: CPT

## 2023-07-22 PROCEDURE — 72220 X-RAY EXAM SACRUM TAILBONE: CPT

## 2023-07-22 NOTE — HISTORY OF PRESENT ILLNESS
[de-identified] : Patient presents for follow-up to a coccyx fracture [Improving] : improving [1] : a current pain level of 1/10 [5] : a maximum pain level of 5/10 [Intermit.] : ~He/She~ states the symptoms seem to be intermittent [Weight Bearing] : worsened by weight bearing [Rest] : relieved by rest [Ataxia] : no ataxia [Incontinence] : no incontinence [Loss of Dexterity] : good dexterity [Urinary Ret.] : no urinary retention

## 2023-07-22 NOTE — PHYSICAL EXAM
[Normal] : Gait: normal [de-identified] : CONSTITUTIONAL: The patient is a very pleasant individual who is well-nourished and who appears stated age.\par PSYCHIATRIC: The patient is alert and oriented X 3 and in no apparent distress, and participates with orthopedic evaluation well.\par HEAD: Atraumatic and is nonsyndromic in appearance.\par EENT: No visible thyromegaly, EOMI.\par RESPIRATORY: Respiratory rate is regular, not dyspneic on examination.\par LYMPHATICS: There is no inguinal lymphadenopathy\par INTEGUMENTARY: Skin is clean, dry, and intact about the bilateral lower extremities and lumbar spine.\par VASCULAR: There is brisk capillary refill about the bilateral lower extremities.\par NEUROLOGIC: There are no pathologic reflexes. There is no decrease in sensation of the bilateral lower extremities on Wartenberg pinwheel examination. Deep tendon reflexes are well maintained at 2+/4 of the bilateral lower extremities and are symmetric..\par MUSCULOSKELETAL: There is no visible muscular atrophy. Manual motor strength is well maintained in the bilateral lower extremities. Range of motion of lumbar spine is well maintained. The patient ambulates in a non-myelopathic manner. Negative tension sign and straight leg raise bilaterally. Quad extension, ankle dorsiflexion, EHL, plantar flexion, and ankle eversion are well preserved. Normal secondary orthopaedic exam of bilateral hips, greater trochanteric area, knees and ankles, coccydynia [de-identified] : 4 views of the pelvis and sacrum reviewed on today's dated July 22, 2023 shows a chronic distal sacral fracture.  Previous CAT scans of the pelvis are also reviewed from March shows a S4 sacral fracture.

## 2023-07-22 NOTE — DISCUSSION/SUMMARY
[de-identified] : Continue with conservative care with regarding coccydynia positional changes comfort pillows etc. follow-up on a as needed basis

## 2024-01-05 RX ORDER — METHYLPREDNISOLONE 4 MG/1
4 TABLET ORAL
Qty: 1 | Refills: 0 | Status: DISCONTINUED | COMMUNITY
Start: 2021-10-20 | End: 2024-01-05

## 2024-03-18 ENCOUNTER — APPOINTMENT (OUTPATIENT)
Dept: ULTRASOUND IMAGING | Facility: CLINIC | Age: 54
End: 2024-03-18
Payer: COMMERCIAL

## 2024-03-18 ENCOUNTER — APPOINTMENT (OUTPATIENT)
Dept: MAMMOGRAPHY | Facility: CLINIC | Age: 54
End: 2024-03-18
Payer: COMMERCIAL

## 2024-03-18 ENCOUNTER — OUTPATIENT (OUTPATIENT)
Dept: OUTPATIENT SERVICES | Facility: HOSPITAL | Age: 54
LOS: 1 days | End: 2024-03-18
Payer: COMMERCIAL

## 2024-03-18 DIAGNOSIS — Z98.89 OTHER SPECIFIED POSTPROCEDURAL STATES: Chronic | ICD-10-CM

## 2024-03-18 DIAGNOSIS — Z00.8 ENCOUNTER FOR OTHER GENERAL EXAMINATION: ICD-10-CM

## 2024-03-18 PROCEDURE — 77063 BREAST TOMOSYNTHESIS BI: CPT

## 2024-03-18 PROCEDURE — 76641 ULTRASOUND BREAST COMPLETE: CPT

## 2024-03-18 PROCEDURE — 77067 SCR MAMMO BI INCL CAD: CPT

## 2024-03-18 PROCEDURE — 77067 SCR MAMMO BI INCL CAD: CPT | Mod: 26

## 2024-03-18 PROCEDURE — 76641 ULTRASOUND BREAST COMPLETE: CPT | Mod: 26,50

## 2024-03-18 PROCEDURE — 77063 BREAST TOMOSYNTHESIS BI: CPT | Mod: 26

## 2024-04-16 DIAGNOSIS — Z00.5 ENCOUNTER FOR EXAMINATION OF POTENTIAL DONOR OF ORGAN AND TISSUE: ICD-10-CM

## 2024-04-19 ENCOUNTER — NON-APPOINTMENT (OUTPATIENT)
Age: 54
End: 2024-04-19

## 2024-04-26 LAB
ABO + RH PNL BLD: NORMAL
ALBUMIN SERPL ELPH-MCNC: 4.4 G/DL
ALP BLD-CCNC: 61 U/L
ALT SERPL-CCNC: 11 U/L
ANION GAP SERPL CALC-SCNC: 10 MMOL/L
APPEARANCE: CLEAR
AST SERPL-CCNC: 19 U/L
BACTERIA: ABNORMAL /HPF
BILIRUB SERPL-MCNC: 0.5 MG/DL
BILIRUBIN URINE: NEGATIVE
BLOOD URINE: NEGATIVE
BUN SERPL-MCNC: 15 MG/DL
CALCIUM SERPL-MCNC: 9.7 MG/DL
CAST: 0 /LPF
CHLORIDE SERPL-SCNC: 103 MMOL/L
CHOLEST SERPL-MCNC: 159 MG/DL
CO2 SERPL-SCNC: 26 MMOL/L
COLOR: YELLOW
CREAT SERPL-MCNC: 0.95 MG/DL
EGFR: 72 ML/MIN/1.73M2
EPITHELIAL CELLS: 8 /HPF
ESTIMATED AVERAGE GLUCOSE: 91 MG/DL
GLUCOSE QUALITATIVE U: NEGATIVE MG/DL
GLUCOSE SERPL-MCNC: 81 MG/DL
HBA1C MFR BLD HPLC: 4.8 %
HCT VFR BLD CALC: 40.9 %
HDLC SERPL-MCNC: 73 MG/DL
HGB BLD-MCNC: 14 G/DL
KETONES URINE: NEGATIVE MG/DL
LDLC SERPL CALC-MCNC: 76 MG/DL
LEUKOCYTE ESTERASE URINE: ABNORMAL
MCHC RBC-ENTMCNC: 30.6 PG
MCHC RBC-ENTMCNC: 34.2 GM/DL
MCV RBC AUTO: 89.3 FL
MICROSCOPIC-UA: NORMAL
NITRITE URINE: NEGATIVE
NONHDLC SERPL-MCNC: 86 MG/DL
PH URINE: 6
PLATELET # BLD AUTO: 242 K/UL
POTASSIUM SERPL-SCNC: 4.4 MMOL/L
PROT SERPL-MCNC: 7 G/DL
PROTEIN URINE: NEGATIVE MG/DL
RBC # BLD: 4.58 M/UL
RBC # FLD: 13.2 %
RED BLOOD CELLS URINE: 1 /HPF
SODIUM SERPL-SCNC: 139 MMOL/L
SPECIFIC GRAVITY URINE: 1.02
TRIGL SERPL-MCNC: 45 MG/DL
UROBILINOGEN URINE: 0.2 MG/DL
WBC # FLD AUTO: 6.08 K/UL
WHITE BLOOD CELLS URINE: 5 /HPF

## 2024-04-29 ENCOUNTER — APPOINTMENT (OUTPATIENT)
Age: 54
End: 2024-04-29
Payer: COMMERCIAL

## 2024-04-29 PROCEDURE — D0330 PANORAMIC RADIOGRAPHIC IMAGE: CPT

## 2024-04-29 PROCEDURE — D9310: CPT

## 2024-06-28 ENCOUNTER — NON-APPOINTMENT (OUTPATIENT)
Age: 54
End: 2024-06-28

## 2024-06-28 ENCOUNTER — APPOINTMENT (OUTPATIENT)
Dept: FAMILY MEDICINE | Facility: CLINIC | Age: 54
End: 2024-06-28
Payer: COMMERCIAL

## 2024-06-28 VITALS
BODY MASS INDEX: 26.12 KG/M2 | HEIGHT: 67 IN | DIASTOLIC BLOOD PRESSURE: 72 MMHG | HEART RATE: 64 BPM | TEMPERATURE: 97.1 F | WEIGHT: 166.4 LBS | OXYGEN SATURATION: 99 % | SYSTOLIC BLOOD PRESSURE: 108 MMHG | RESPIRATION RATE: 16 BRPM

## 2024-06-28 VITALS — DIASTOLIC BLOOD PRESSURE: 80 MMHG | SYSTOLIC BLOOD PRESSURE: 130 MMHG

## 2024-06-28 DIAGNOSIS — R05.2 SUBACUTE COUGH: ICD-10-CM

## 2024-06-28 DIAGNOSIS — J45.909 UNSPECIFIED ASTHMA, UNCOMPLICATED: ICD-10-CM

## 2024-06-28 DIAGNOSIS — Z83.79 FAMILY HISTORY OF OTHER DISEASES OF THE DIGESTIVE SYSTEM: ICD-10-CM

## 2024-06-28 DIAGNOSIS — Z00.00 ENCOUNTER FOR GENERAL ADULT MEDICAL EXAMINATION W/OUT ABNORMAL FINDINGS: ICD-10-CM

## 2024-06-28 DIAGNOSIS — J30.9 ALLERGIC RHINITIS, UNSPECIFIED: ICD-10-CM

## 2024-06-28 DIAGNOSIS — H61.21 IMPACTED CERUMEN, RIGHT EAR: ICD-10-CM

## 2024-06-28 LAB
ALBUMIN SERPL ELPH-MCNC: 4.5 G/DL
ALP BLD-CCNC: 69 U/L
ALT SERPL-CCNC: 8 U/L
ANION GAP SERPL CALC-SCNC: 11 MMOL/L
AST SERPL-CCNC: 21 U/L
BASOPHILS # BLD AUTO: 0.04 K/UL
BASOPHILS NFR BLD AUTO: 0.6 %
BILIRUB SERPL-MCNC: 0.5 MG/DL
BILIRUB UR QL STRIP: NEGATIVE
BUN SERPL-MCNC: 15 MG/DL
CALCIUM SERPL-MCNC: 9.8 MG/DL
CHLORIDE SERPL-SCNC: 102 MMOL/L
CHOLEST SERPL-MCNC: 163 MG/DL
CO2 SERPL-SCNC: 27 MMOL/L
CREAT SERPL-MCNC: 0.85 MG/DL
EGFR: 82 ML/MIN/1.73M2
EOSINOPHIL # BLD AUTO: 0.55 K/UL
EOSINOPHIL NFR BLD AUTO: 8.6 %
GLUCOSE SERPL-MCNC: 95 MG/DL
GLUCOSE UR-MCNC: NEGATIVE
HCG UR QL: 1 EU/DL
HCT VFR BLD CALC: 41.5 %
HDLC SERPL-MCNC: 74 MG/DL
HGB BLD-MCNC: 13.9 G/DL
HGB UR QL STRIP.AUTO: NEGATIVE
IMM GRANULOCYTES NFR BLD AUTO: 0.2 %
KETONES UR-MCNC: NEGATIVE
LDLC SERPL CALC-MCNC: 81 MG/DL
LEUKOCYTE ESTERASE UR QL STRIP: ABNORMAL
LYMPHOCYTES # BLD AUTO: 1.76 K/UL
LYMPHOCYTES NFR BLD AUTO: 27.5 %
MAN DIFF?: NORMAL
MCHC RBC-ENTMCNC: 30.3 PG
MCHC RBC-ENTMCNC: 33.5 GM/DL
MCV RBC AUTO: 90.6 FL
MONOCYTES # BLD AUTO: 0.42 K/UL
MONOCYTES NFR BLD AUTO: 6.6 %
NEUTROPHILS # BLD AUTO: 3.61 K/UL
NEUTROPHILS NFR BLD AUTO: 56.5 %
NITRITE UR QL STRIP: NEGATIVE
NONHDLC SERPL-MCNC: 89 MG/DL
PH UR STRIP: 7
PLATELET # BLD AUTO: 236 K/UL
POTASSIUM SERPL-SCNC: 4.2 MMOL/L
PROT SERPL-MCNC: 7.3 G/DL
PROT UR STRIP-MCNC: NEGATIVE
RBC # BLD: 4.58 M/UL
RBC # FLD: 13.4 %
SODIUM SERPL-SCNC: 140 MMOL/L
SP GR UR STRIP: 1.01
TRIGL SERPL-MCNC: 33 MG/DL
TSH SERPL-ACNC: 1.39 UIU/ML
WBC # FLD AUTO: 6.39 K/UL

## 2024-06-28 PROCEDURE — 36415 COLL VENOUS BLD VENIPUNCTURE: CPT

## 2024-06-28 PROCEDURE — 99213 OFFICE O/P EST LOW 20 MIN: CPT | Mod: 25

## 2024-06-28 PROCEDURE — 69210 REMOVE IMPACTED EAR WAX UNI: CPT

## 2024-06-28 PROCEDURE — 99396 PREV VISIT EST AGE 40-64: CPT

## 2024-06-28 PROCEDURE — 93000 ELECTROCARDIOGRAM COMPLETE: CPT | Mod: 59

## 2024-06-28 PROCEDURE — 81003 URINALYSIS AUTO W/O SCOPE: CPT | Mod: QW

## 2024-07-05 ENCOUNTER — APPOINTMENT (OUTPATIENT)
Dept: FAMILY MEDICINE | Facility: CLINIC | Age: 54
End: 2024-07-05

## 2024-07-09 ENCOUNTER — OUTPATIENT (OUTPATIENT)
Dept: OUTPATIENT SERVICES | Facility: HOSPITAL | Age: 54
LOS: 1 days | End: 2024-07-09
Payer: COMMERCIAL

## 2024-07-09 DIAGNOSIS — R05.2 SUBACUTE COUGH: ICD-10-CM

## 2024-07-09 DIAGNOSIS — Z98.89 UNDEFINED: Chronic | ICD-10-CM

## 2024-07-09 PROCEDURE — 71046 X-RAY EXAM CHEST 2 VIEWS: CPT | Mod: 26

## 2024-08-26 ENCOUNTER — NON-APPOINTMENT (OUTPATIENT)
Age: 54
End: 2024-08-26

## 2024-09-17 ENCOUNTER — APPOINTMENT (OUTPATIENT)
Dept: GASTROENTEROLOGY | Facility: CLINIC | Age: 54
End: 2024-09-17
Payer: COMMERCIAL

## 2024-09-17 VITALS
DIASTOLIC BLOOD PRESSURE: 70 MMHG | HEIGHT: 67 IN | RESPIRATION RATE: 16 BRPM | WEIGHT: 162 LBS | HEART RATE: 70 BPM | SYSTOLIC BLOOD PRESSURE: 120 MMHG | OXYGEN SATURATION: 98 % | BODY MASS INDEX: 25.43 KG/M2

## 2024-09-17 DIAGNOSIS — Z12.11 ENCOUNTER FOR SCREENING FOR MALIGNANT NEOPLASM OF COLON: ICD-10-CM

## 2024-09-17 PROCEDURE — 99203 OFFICE O/P NEW LOW 30 MIN: CPT

## 2024-09-17 NOTE — ADDENDUM
[FreeTextEntry1] : I, Sheila Sanon NP, acted as scribe for Dr. Wilman Luong for this patient encounter.

## 2024-09-17 NOTE — ASSESSMENT
[FreeTextEntry1] : Colon cancer screening - Will schedule patient for colonoscopy - Miralax/dulcolax prep - I have discussed the indications, benefits, risks, and alternatives to colonoscopy with the patient. The patient understands all options and has agreed to have a colonoscopy and is medically optimized for the planned procedure.   I evaluated this patient with my ACP and agree with the above assessment and management plan for low risk screening colonoscopy.  Her ASA classification is 2 and she is medically optimized for the proposed colonoscopy and appeared to understand all of the above instructions, information, and management plan.

## 2024-09-17 NOTE — HISTORY OF PRESENT ILLNESS
[FreeTextEntry1] : Ms. Susy Ellis is a 54-year-old female with no significant medical history who presents for consultation for colonoscopy. Patient offers no complaints today. She had a colonoscopy about 7 years ago or more at [facility] for anemia which was normal as per patient. Most recent H/H in July 13.9/41. No reports of bleeding. No UGI symptoms. Denies abdominal pain, unintentional weight loss, bowel changes, hematochezia or melena. No family history of colon cancer or polyps.

## 2024-11-20 ENCOUNTER — APPOINTMENT (OUTPATIENT)
Dept: DERMATOLOGY | Facility: CLINIC | Age: 54
End: 2024-11-20
Payer: COMMERCIAL

## 2024-11-20 PROCEDURE — 99213 OFFICE O/P EST LOW 20 MIN: CPT

## 2024-12-10 ENCOUNTER — TRANSCRIPTION ENCOUNTER (OUTPATIENT)
Age: 54
End: 2024-12-10

## 2024-12-10 ENCOUNTER — OUTPATIENT (OUTPATIENT)
Dept: OUTPATIENT SERVICES | Facility: HOSPITAL | Age: 54
LOS: 1 days | End: 2024-12-10
Payer: COMMERCIAL

## 2024-12-10 ENCOUNTER — APPOINTMENT (OUTPATIENT)
Dept: GASTROENTEROLOGY | Facility: GI CENTER | Age: 54
End: 2024-12-10
Payer: COMMERCIAL

## 2024-12-10 DIAGNOSIS — K64.8 OTHER HEMORRHOIDS: ICD-10-CM

## 2024-12-10 DIAGNOSIS — Z98.89 OTHER SPECIFIED POSTPROCEDURAL STATES: Chronic | ICD-10-CM

## 2024-12-10 DIAGNOSIS — Z12.11 ENCOUNTER FOR SCREENING FOR MALIGNANT NEOPLASM OF COLON: ICD-10-CM

## 2024-12-10 PROCEDURE — G0121: CPT

## 2024-12-10 PROCEDURE — 45378 DIAGNOSTIC COLONOSCOPY: CPT | Mod: 33

## 2024-12-10 RX ORDER — SODIUM SULFATE, POTASSIUM SULFATE AND MAGNESIUM SULFATE 1.6; 3.13; 17.5 G/177ML; G/177ML; G/177ML
17.5-3.13-1.6 SOLUTION ORAL
Qty: 2 | Refills: 0 | Status: ACTIVE | COMMUNITY
Start: 2024-12-10 | End: 1900-01-01

## 2024-12-24 PROBLEM — F10.90 ALCOHOL USE: Status: ACTIVE | Noted: 2019-09-13

## 2024-12-27 ENCOUNTER — APPOINTMENT (OUTPATIENT)
Dept: DERMATOLOGY | Facility: CLINIC | Age: 54
End: 2024-12-27
Payer: COMMERCIAL

## 2024-12-27 PROCEDURE — 99213 OFFICE O/P EST LOW 20 MIN: CPT | Mod: 25

## 2024-12-27 PROCEDURE — 17000 DESTRUCT PREMALG LESION: CPT

## 2025-03-25 ENCOUNTER — OUTPATIENT (OUTPATIENT)
Dept: OUTPATIENT SERVICES | Facility: HOSPITAL | Age: 55
LOS: 1 days | End: 2025-03-25
Payer: COMMERCIAL

## 2025-03-25 ENCOUNTER — TRANSCRIPTION ENCOUNTER (OUTPATIENT)
Age: 55
End: 2025-03-25

## 2025-03-25 ENCOUNTER — APPOINTMENT (OUTPATIENT)
Dept: GASTROENTEROLOGY | Facility: GI CENTER | Age: 55
End: 2025-03-25
Payer: COMMERCIAL

## 2025-03-25 DIAGNOSIS — Z98.89 OTHER SPECIFIED POSTPROCEDURAL STATES: Chronic | ICD-10-CM

## 2025-03-25 DIAGNOSIS — Z12.11 ENCOUNTER FOR SCREENING FOR MALIGNANT NEOPLASM OF COLON: ICD-10-CM

## 2025-03-25 PROCEDURE — G0121: CPT

## 2025-03-25 PROCEDURE — 45378 DIAGNOSTIC COLONOSCOPY: CPT | Mod: 33

## 2025-05-10 ENCOUNTER — OUTPATIENT (OUTPATIENT)
Dept: OUTPATIENT SERVICES | Facility: HOSPITAL | Age: 55
LOS: 1 days | End: 2025-05-10
Payer: COMMERCIAL

## 2025-05-10 ENCOUNTER — APPOINTMENT (OUTPATIENT)
Dept: MAMMOGRAPHY | Facility: CLINIC | Age: 55
End: 2025-05-10
Payer: COMMERCIAL

## 2025-05-10 ENCOUNTER — APPOINTMENT (OUTPATIENT)
Dept: ULTRASOUND IMAGING | Facility: CLINIC | Age: 55
End: 2025-05-10
Payer: COMMERCIAL

## 2025-05-10 DIAGNOSIS — Z98.89 OTHER SPECIFIED POSTPROCEDURAL STATES: Chronic | ICD-10-CM

## 2025-05-10 DIAGNOSIS — Z00.8 ENCOUNTER FOR OTHER GENERAL EXAMINATION: ICD-10-CM

## 2025-05-10 PROCEDURE — 77063 BREAST TOMOSYNTHESIS BI: CPT

## 2025-05-10 PROCEDURE — 76641 ULTRASOUND BREAST COMPLETE: CPT | Mod: 26,50

## 2025-05-10 PROCEDURE — 77063 BREAST TOMOSYNTHESIS BI: CPT | Mod: 26

## 2025-05-10 PROCEDURE — 77067 SCR MAMMO BI INCL CAD: CPT

## 2025-05-10 PROCEDURE — 77067 SCR MAMMO BI INCL CAD: CPT | Mod: 26

## 2025-05-10 PROCEDURE — 76641 ULTRASOUND BREAST COMPLETE: CPT

## 2025-05-17 ENCOUNTER — NON-APPOINTMENT (OUTPATIENT)
Age: 55
End: 2025-05-17

## 2025-05-19 ENCOUNTER — APPOINTMENT (OUTPATIENT)
Dept: DERMATOLOGY | Facility: CLINIC | Age: 55
End: 2025-05-19
Payer: COMMERCIAL

## 2025-05-19 ENCOUNTER — RESULT REVIEW (OUTPATIENT)
Age: 55
End: 2025-05-19

## 2025-05-19 PROCEDURE — 99214 OFFICE O/P EST MOD 30 MIN: CPT | Mod: 25

## 2025-05-19 PROCEDURE — 11402 EXC TR-EXT B9+MARG 1.1-2 CM: CPT

## 2025-05-19 PROCEDURE — 13100 CMPLX RPR TRUNK 1.1-2.5 CM: CPT

## 2025-05-30 ENCOUNTER — APPOINTMENT (OUTPATIENT)
Dept: DERMATOLOGY | Facility: CLINIC | Age: 55
End: 2025-05-30

## 2025-08-07 ENCOUNTER — NON-APPOINTMENT (OUTPATIENT)
Age: 55
End: 2025-08-07

## 2025-08-07 ENCOUNTER — APPOINTMENT (OUTPATIENT)
Dept: INTERNAL MEDICINE | Facility: CLINIC | Age: 55
End: 2025-08-07
Payer: COMMERCIAL

## 2025-08-07 VITALS
HEART RATE: 67 BPM | OXYGEN SATURATION: 100 % | TEMPERATURE: 97.3 F | SYSTOLIC BLOOD PRESSURE: 122 MMHG | DIASTOLIC BLOOD PRESSURE: 80 MMHG

## 2025-08-07 DIAGNOSIS — J45.909 UNSPECIFIED ASTHMA, UNCOMPLICATED: ICD-10-CM

## 2025-08-07 DIAGNOSIS — D64.9 ANEMIA, UNSPECIFIED: ICD-10-CM

## 2025-08-07 DIAGNOSIS — N89.8 OTHER SPECIFIED NONINFLAMMATORY DISORDERS OF VAGINA: ICD-10-CM

## 2025-08-07 DIAGNOSIS — Z23 ENCOUNTER FOR IMMUNIZATION: ICD-10-CM

## 2025-08-07 DIAGNOSIS — Z00.00 ENCOUNTER FOR GENERAL ADULT MEDICAL EXAMINATION W/OUT ABNORMAL FINDINGS: ICD-10-CM

## 2025-08-07 PROCEDURE — 90472 IMMUNIZATION ADMIN EACH ADD: CPT

## 2025-08-07 PROCEDURE — G0009: CPT | Mod: 59

## 2025-08-07 PROCEDURE — 36415 COLL VENOUS BLD VENIPUNCTURE: CPT

## 2025-08-07 PROCEDURE — 90715 TDAP VACCINE 7 YRS/> IM: CPT

## 2025-08-07 PROCEDURE — 93000 ELECTROCARDIOGRAM COMPLETE: CPT

## 2025-08-07 PROCEDURE — 99386 PREV VISIT NEW AGE 40-64: CPT | Mod: 25,GC

## 2025-08-07 PROCEDURE — 90677 PCV20 VACCINE IM: CPT

## 2025-08-08 LAB
25(OH)D3 SERPL-MCNC: 36.1 NG/ML
ALBUMIN SERPL ELPH-MCNC: 4.6 G/DL
ALP BLD-CCNC: 76 U/L
ALT SERPL-CCNC: 19 U/L
ANION GAP SERPL CALC-SCNC: 18 MMOL/L
APPEARANCE: CLEAR
AST SERPL-CCNC: 29 U/L
BACTERIA: NEGATIVE /HPF
BASOPHILS # BLD AUTO: 0.05 K/UL
BASOPHILS NFR BLD AUTO: 0.7 %
BILIRUB SERPL-MCNC: 0.5 MG/DL
BILIRUBIN URINE: NEGATIVE
BLOOD URINE: NEGATIVE
BUN SERPL-MCNC: 21 MG/DL
CALCIUM SERPL-MCNC: 9.9 MG/DL
CAST: 0 /LPF
CHLORIDE SERPL-SCNC: 100 MMOL/L
CHOLEST SERPL-MCNC: 175 MG/DL
CO2 SERPL-SCNC: 25 MMOL/L
COLOR: YELLOW
CREAT SERPL-MCNC: 0.82 MG/DL
EGFRCR SERPLBLD CKD-EPI 2021: 85 ML/MIN/1.73M2
EOSINOPHIL # BLD AUTO: 0.6 K/UL
EOSINOPHIL NFR BLD AUTO: 8.4 %
EPITHELIAL CELLS: 1 /HPF
ESTIMATED AVERAGE GLUCOSE: 91 MG/DL
GLUCOSE QUALITATIVE U: NEGATIVE MG/DL
GLUCOSE SERPL-MCNC: 53 MG/DL
HBA1C MFR BLD HPLC: 4.8 %
HCT VFR BLD CALC: 42.1 %
HDLC SERPL-MCNC: 72 MG/DL
HGB BLD-MCNC: 14.1 G/DL
HIV1+2 AB SPEC QL IA.RAPID: NONREACTIVE
IMM GRANULOCYTES NFR BLD AUTO: 0.1 %
KETONES URINE: NEGATIVE MG/DL
LDLC SERPL-MCNC: 91 MG/DL
LEUKOCYTE ESTERASE URINE: ABNORMAL
LYMPHOCYTES # BLD AUTO: 2.09 K/UL
LYMPHOCYTES NFR BLD AUTO: 29.4 %
MAN DIFF?: NORMAL
MCHC RBC-ENTMCNC: 30.4 PG
MCHC RBC-ENTMCNC: 33.5 G/DL
MCV RBC AUTO: 90.7 FL
MICROSCOPIC-UA: NORMAL
MONOCYTES # BLD AUTO: 0.47 K/UL
MONOCYTES NFR BLD AUTO: 6.6 %
NEUTROPHILS # BLD AUTO: 3.9 K/UL
NEUTROPHILS NFR BLD AUTO: 54.8 %
NITRITE URINE: NEGATIVE
NONHDLC SERPL-MCNC: 103 MG/DL
PH URINE: 6.5
PLATELET # BLD AUTO: 232 K/UL
POTASSIUM SERPL-SCNC: 3.7 MMOL/L
PROT SERPL-MCNC: 7.6 G/DL
PROTEIN URINE: NEGATIVE MG/DL
RBC # BLD: 4.64 M/UL
RBC # FLD: 13.4 %
RED BLOOD CELLS URINE: 1 /HPF
REVIEW: NORMAL
SODIUM SERPL-SCNC: 143 MMOL/L
SPECIFIC GRAVITY URINE: 1.01
TRIGL SERPL-MCNC: 65 MG/DL
TSH SERPL-ACNC: 1.33 UIU/ML
UROBILINOGEN URINE: 1 MG/DL
WBC # FLD AUTO: 7.12 K/UL
WHITE BLOOD CELLS URINE: 1 /HPF

## 2025-08-09 LAB
HCV AB SER QL: NONREACTIVE
HCV S/CO RATIO: 0.13 S/CO

## (undated) DEVICE — KIT DEFENDO 4 OLY 4 PC